# Patient Record
Sex: MALE | Race: WHITE | NOT HISPANIC OR LATINO | URBAN - METROPOLITAN AREA
[De-identification: names, ages, dates, MRNs, and addresses within clinical notes are randomized per-mention and may not be internally consistent; named-entity substitution may affect disease eponyms.]

---

## 2017-04-12 NOTE — PATIENT DISCUSSION
*POAG, OU: INTRAOCULAR PRESSURE IS WITHIN ACCEPTABLE LIMITS. STRESSED COMPLIANCE WITH CONSISTENT DROP USE. PT INSTRUCTED TO CONTINUE WITH DROPS AS PRESCRIBED AND RETURN FOR FOLLOW-UP AS SCHEDULED.

## 2018-01-09 NOTE — PATIENT DISCUSSION
POAG, OU Counseling: I have reviewed and have stressed the importance of compliance and regular follow up care as scheduled.

## 2018-01-09 NOTE — PATIENT DISCUSSION
POAG, OU: INTRAOCULAR PRESSURE IS WITHIN ACCEPTABLE LIMITS WITHOUT DROPS. PT INSTRUCTED TO RETURN FOR FOLLOW-UP AS SCHEDULED.

## 2018-04-19 NOTE — PATIENT DISCUSSION
Epiretinal Membrane Counseling: The diagnosis and natural history of epiretinal membrane was discussed with the patient including the risk of progression with retinal traction resulting in visual distortion. Patient instructed on how to do [address] to check for distortion in vision, The patient is instructed to call back immediately if any vision changes, and keep follow up as scheduled.

## 2018-04-19 NOTE — PATIENT DISCUSSION
*POAG, OU: INTRAOCULAR PRESSURE IS WITHIN ACCEPTABLE LIMITS S/P SLT OU. NO NEED FOR DROPS AT THIS TIME. RETURN FOR FOLLOW-UP AS SCHEDULED.

## 2018-08-30 ENCOUNTER — IMPORTED ENCOUNTER (OUTPATIENT)
Dept: URBAN - METROPOLITAN AREA CLINIC 50 | Facility: CLINIC | Age: 68
End: 2018-08-30

## 2019-04-18 NOTE — PATIENT DISCUSSION
Epiretinal Membrane Counseling: The diagnosis and natural history of epiretinal membrane was discussed with the patient including the risk of progression with retinal traction resulting in visual distortion. Patient instructed on how to do [address] to check for distortion in vision, The patient is instructed to call back immediately if any vision changes, and keep follow up as scheduled.

## 2019-04-18 NOTE — PATIENT DISCUSSION
MILD DRY EYE, OU: PRESCRIBED REFRESH REPAIR ARTIFICIAL TEARS PRN OU. RECOMMENDS OMEGA-3 FISH OIL WITH [facility]IMARY [facility] PHYSICIANS APPROVAL. RETURN FOR FOLLOW-UP AS SCHEDULED OR SOONER IF SYMPTOMS WORSEN.

## 2019-09-19 ENCOUNTER — IMPORTED ENCOUNTER (OUTPATIENT)
Dept: URBAN - METROPOLITAN AREA CLINIC 50 | Facility: CLINIC | Age: 69
End: 2019-09-19

## 2020-02-07 NOTE — PATIENT DISCUSSION
POAG, OU: PROGRESSION NOTED ON HVF, OU. PRESCRIBED LATANOPROST QHS OU. RETURN FOR FOLLOW AS SCHEDULED- IOP CHECK 4-6 weeks.

## 2020-02-07 NOTE — PATIENT DISCUSSION
New Prescription: latanoprost (latanoprost): drops: 0.005% 1 drop at bedtime as directed into both eyes 02-

## 2020-08-13 NOTE — PATIENT DISCUSSION
Epiretinal Membrane Counseling: The diagnosis and natural history of epiretinal membrane was discussed with the patient including the risk of progression with retinal traction resulting in visual distortion. Patient instructed on how to do [address] to check for distortion in vision, The patient is instructed to call back immediately if any vision changes, and keep follow up as scheduled.

## 2020-08-13 NOTE — PATIENT DISCUSSION
*POAG, OU: INTRAOCULAR PRESSURE IS SLIGHTLY HIGHER THAN THEY HAVE BEEN BUT STILL WITHIN ACCEPTABLE LIMITS. STRESSED COMPLIANCE WITH CONSISTENT DROP USE. PT INSTRUCTED TO CONTINUE WITH DROPS AS PRESCRIBED AND RETURN FOR FOLLOW-UP AS SCHEDULED.

## 2020-09-24 ENCOUNTER — IMPORTED ENCOUNTER (OUTPATIENT)
Dept: URBAN - METROPOLITAN AREA CLINIC 50 | Facility: CLINIC | Age: 70
End: 2020-09-24

## 2021-02-02 NOTE — PATIENT DISCUSSION
POAG OU: HVF TODAY STABLE OU. CONTINUE LATANOPROST QHS OU. RETURN FOR FOLLOW-UP AS DIRECTED. MONITOR.

## 2021-04-17 ASSESSMENT — TONOMETRY
OS_IOP_MMHG: 16
OD_IOP_MMHG: 19
OD_IOP_MMHG: 16
OD_IOP_MMHG: 20
OS_IOP_MMHG: 18
OS_IOP_MMHG: 18

## 2021-04-17 ASSESSMENT — VISUAL ACUITY
OD_SC: 20/25+2
OD_OTHER: 20/30. 20/400.
OS_PH: 20/40+1
OS_PH: 20/25-2
OD_PH: 20/25
OD_CC: J1+
OD_SC: 20/20
OD_BAT: 20/50
OD_CC: J1+
OS_OTHER: >20/400.
OS_SC: 20/70
OS_SC: 20/200
OS_BAT: 20/400
OD_SC: 20/30-1
OS_CC: J1+
OS_BAT: 20/40
OS_PH: 20/40
OS_OTHER: 20/40. <20/400.
OD_BAT: 20/30
OS_SC: 20/60+1
OS_CC: J1+
OD_BAT: 20/30
OD_OTHER: 20/30. 20/40.
OS_OTHER: 20/400. >20/400.
OS_BAT: >20/400
OD_OTHER: 20/50. 20/60.
OS_CC: J1+
OD_CC: J1+

## 2021-08-12 NOTE — PATIENT DISCUSSION
Epiretinal Membrane Counseling: The diagnosis and natural history of epiretinal membrane was discussed with the patient including the risk of progression with retinal traction resulting in visual distortion. Patient instructed on how to do [address] to check for distortion in vision, The patient is instructed to call back immediately if any vision changes, and keep follow up as scheduled.

## 2021-09-27 ENCOUNTER — PREPPED CHART (OUTPATIENT)
Dept: URBAN - METROPOLITAN AREA CLINIC 52 | Facility: CLINIC | Age: 71
End: 2021-09-27

## 2021-09-30 ENCOUNTER — ANNUAL COMPREHENSIVE EXAM (OUTPATIENT)
Dept: URBAN - METROPOLITAN AREA CLINIC 52 | Facility: CLINIC | Age: 71
End: 2021-09-30

## 2021-09-30 DIAGNOSIS — H25.13: ICD-10-CM

## 2021-09-30 PROCEDURE — 92015 DETERMINE REFRACTIVE STATE: CPT

## 2021-09-30 PROCEDURE — 92014 COMPRE OPH EXAM EST PT 1/>: CPT

## 2021-09-30 ASSESSMENT — VISUAL ACUITY
OS_CC: 20/200+1
OD_PH: 20/20-2
OS_PH: 20/40
OU_SC: J1+(-2)
OD_GLARE: 20/40
OS_GLARE: 20/400
OD_GLARE: 20/70
OD_CC: 20/40 SLOW

## 2021-09-30 ASSESSMENT — TONOMETRY
OS_IOP_MMHG: 14
OD_IOP_MMHG: 16

## 2021-10-13 ENCOUNTER — BIOMETRY (OUTPATIENT)
Dept: URBAN - METROPOLITAN AREA CLINIC 52 | Facility: CLINIC | Age: 71
End: 2021-10-13

## 2021-10-13 DIAGNOSIS — H25.13: ICD-10-CM

## 2021-10-13 DIAGNOSIS — H35.3131: ICD-10-CM

## 2021-10-13 PROCEDURE — 92136 OPHTHALMIC BIOMETRY: CPT

## 2021-10-13 PROCEDURE — 92134 CPTRZ OPH DX IMG PST SGM RTA: CPT

## 2021-10-13 PROCEDURE — TOPOIOL CORNEAL TOPOGRAPHY-PREMIUM IOL

## 2021-10-13 ASSESSMENT — KERATOMETRY
OD_AXISANGLE_DEGREES: 86
OD_K2POWER_DIOPTERS: 43.75
OD_K1POWER_DIOPTERS: 44.12
OD_AXISANGLE2_DEGREES: 176
OS_AXISANGLE2_DEGREES: 3
OS_AXISANGLE_DEGREES: 093
OS_K1POWER_DIOPTERS: 43.87
OS_K2POWER_DIOPTERS: 43.37

## 2021-10-28 ENCOUNTER — PRE OP - CE/IOL OS (OUTPATIENT)
Dept: URBAN - METROPOLITAN AREA CLINIC 52 | Facility: CLINIC | Age: 71
End: 2021-10-28

## 2021-10-28 DIAGNOSIS — H25.12: ICD-10-CM

## 2021-10-28 PROCEDURE — PREOP PRE OP VISIT

## 2021-10-28 ASSESSMENT — TONOMETRY
OD_IOP_MMHG: 14
OS_IOP_MMHG: 15

## 2021-10-28 ASSESSMENT — KERATOMETRY
OS_AXISANGLE_DEGREES: 093
OD_AXISANGLE2_DEGREES: 176
OS_K1POWER_DIOPTERS: 43.87
OS_AXISANGLE2_DEGREES: 3
OD_K2POWER_DIOPTERS: 43.75
OD_K1POWER_DIOPTERS: 44.12
OS_K2POWER_DIOPTERS: 43.37
OD_AXISANGLE_DEGREES: 86

## 2021-10-28 ASSESSMENT — VISUAL ACUITY
OS_SC: 20/200
OD_SC: 20/40-1

## 2021-10-28 NOTE — PATIENT DISCUSSION
CATARACT SURGERY PLANNER - STANDARD IOL/NO FEMTO: Phacoemulsification with IOL: Eye: LEFT|DOS: 11/3/21|Model: AABB0|Power: 20. 5|Target: NEAR|Visc: DUET|Omidria: YES|10% Phenylephrine: NO|Epi-shugarcaine: NO|Phaco Setting: DENSE|BSS+: NO|Trypan Blue: NO|CTR: NO|Olive Tip: NO|Atropine: NO|Pupilloplasty: NO|Notes: *.

## 2021-11-03 ENCOUNTER — SAME DAY PO (OUTPATIENT)
Dept: URBAN - METROPOLITAN AREA CLINIC 48 | Facility: CLINIC | Age: 71
End: 2021-11-03

## 2021-11-03 ENCOUNTER — SURGERY/PROCEDURE (OUTPATIENT)
Dept: URBAN - METROPOLITAN AREA SURGERY 16 | Facility: SURGERY | Age: 71
End: 2021-11-03

## 2021-11-03 DIAGNOSIS — Z98.42: ICD-10-CM

## 2021-11-03 DIAGNOSIS — H25.12: ICD-10-CM

## 2021-11-03 PROCEDURE — 66984 XCAPSL CTRC RMVL W/O ECP: CPT

## 2021-11-03 PROCEDURE — 99024 POSTOP FOLLOW-UP VISIT: CPT

## 2021-11-03 PROCEDURE — AAB00 SENSAR MONOFOCAL IOL – NO FEMTO

## 2021-11-03 ASSESSMENT — KERATOMETRY
OS_K1POWER_DIOPTERS: 43.87
OD_K1POWER_DIOPTERS: 44.12
OD_AXISANGLE2_DEGREES: 176
OD_K2POWER_DIOPTERS: 43.75
OS_AXISANGLE2_DEGREES: 3
OS_K1POWER_DIOPTERS: 43.87
OS_K2POWER_DIOPTERS: 43.37
OD_AXISANGLE_DEGREES: 86
OS_AXISANGLE2_DEGREES: 3
OD_K1POWER_DIOPTERS: 44.12
OD_AXISANGLE2_DEGREES: 176
OS_K2POWER_DIOPTERS: 43.37
OS_AXISANGLE_DEGREES: 093
OD_AXISANGLE_DEGREES: 86
OS_AXISANGLE_DEGREES: 093
OD_K2POWER_DIOPTERS: 43.75

## 2021-11-03 ASSESSMENT — VISUAL ACUITY: OS_CC: 20/150

## 2021-11-03 ASSESSMENT — TONOMETRY: OS_IOP_MMHG: 16

## 2021-11-03 NOTE — PROCEDURE NOTE: SURGICAL
"<span style=""color: [id number]; font-family: tammy [facility] negative - no change in level of consciousness

## 2021-11-11 ENCOUNTER — PRE OP - CE/IOL OD / 1 WEEK PO OS (OUTPATIENT)
Dept: URBAN - METROPOLITAN AREA CLINIC 52 | Facility: CLINIC | Age: 71
End: 2021-11-11

## 2021-11-11 DIAGNOSIS — H25.11: ICD-10-CM

## 2021-11-11 PROCEDURE — 92136 OPHTHALMIC BIOMETRY: CPT

## 2021-11-11 PROCEDURE — PREOP PRE OP VISIT

## 2021-11-11 ASSESSMENT — KERATOMETRY
OS_K2POWER_DIOPTERS: 43.37
OD_AXISANGLE_DEGREES: 86
OS_AXISANGLE2_DEGREES: 3
OD_K1POWER_DIOPTERS: 44.12
OS_AXISANGLE_DEGREES: 093
OS_K1POWER_DIOPTERS: 43.87
OD_AXISANGLE2_DEGREES: 176
OD_K2POWER_DIOPTERS: 43.75

## 2021-11-11 ASSESSMENT — VISUAL ACUITY
OS_PH: 20/30
OS_SC: J1
OD_SC: 20/30
OS_SC: 20/200
OD_PH: 20/20

## 2021-11-11 ASSESSMENT — TONOMETRY
OD_IOP_MMHG: 13
OS_IOP_MMHG: 14

## 2021-11-11 NOTE — PATIENT DISCUSSION
CATARACT SURGERY PLANNER - STANDARD IOL/NO FEMTO: Phacoemulsification with IOL: Eye: right|DOS: 12/8/21|Model: [id number]|Power: 16. 5|Target: dist|Visc: duet|Omidria: yes|10% Phenylephrine: no|Epi-shugarcaine: yes|Phaco Setting: dense|BSS+: no|Trypan Blue: no|CTR: no|Olive Tip: no|Atropine: no|Pupilloplasty: no|Notes: ARMD, MonoVA.

## 2021-12-07 ASSESSMENT — KERATOMETRY
OD_AXISANGLE_DEGREES: 86
OD_K1POWER_DIOPTERS: 44.12
OS_K1POWER_DIOPTERS: 43.87
OS_AXISANGLE_DEGREES: 093
OD_AXISANGLE2_DEGREES: 176
OS_AXISANGLE2_DEGREES: 3
OS_K2POWER_DIOPTERS: 43.37
OD_K2POWER_DIOPTERS: 43.75

## 2021-12-08 ENCOUNTER — SURGERY/PROCEDURE (OUTPATIENT)
Dept: URBAN - METROPOLITAN AREA SURGERY 16 | Facility: SURGERY | Age: 71
End: 2021-12-08

## 2021-12-08 ENCOUNTER — POST-OP (OUTPATIENT)
Dept: URBAN - METROPOLITAN AREA CLINIC 52 | Facility: CLINIC | Age: 71
End: 2021-12-08

## 2021-12-08 DIAGNOSIS — H25.11: ICD-10-CM

## 2021-12-08 DIAGNOSIS — Z98.41: ICD-10-CM

## 2021-12-08 DIAGNOSIS — Z96.1: ICD-10-CM

## 2021-12-08 PROCEDURE — 99024 POSTOP FOLLOW-UP VISIT: CPT

## 2021-12-08 PROCEDURE — 66984 XCAPSL CTRC RMVL W/O ECP: CPT

## 2021-12-08 ASSESSMENT — VISUAL ACUITY: OD_SC: 20/60-1

## 2021-12-08 ASSESSMENT — KERATOMETRY
OS_AXISANGLE_DEGREES: 093
OD_K1POWER_DIOPTERS: 44.12
OD_K2POWER_DIOPTERS: 43.75
OS_AXISANGLE2_DEGREES: 3
OS_K2POWER_DIOPTERS: 43.37
OD_AXISANGLE_DEGREES: 86
OD_AXISANGLE2_DEGREES: 176
OS_K1POWER_DIOPTERS: 43.87

## 2021-12-08 ASSESSMENT — TONOMETRY: OD_IOP_MMHG: 24

## 2021-12-08 NOTE — PATIENT DISCUSSION
The patient was found to be doing well postoperatively and was advised on drop instruction for steroid/nsaid/abx and home care. PO instructions reviewed with patient and family.

## 2021-12-08 NOTE — PATIENT DISCUSSION
Advised to call immediately if any worsening distortion or blurring is noted. FUV: 1.28.20    Last seen: 10.29.19    Per PDMP, last dispensed on 12.20.19 for both.    Is the patient due for refill of this medication(s): Yes    PDMP review: Criteria met. Forwarded to Physician/LETICIA for signature.

## 2021-12-16 ENCOUNTER — POST-OP (OUTPATIENT)
Dept: URBAN - METROPOLITAN AREA CLINIC 52 | Facility: CLINIC | Age: 71
End: 2021-12-16

## 2021-12-16 DIAGNOSIS — Z96.1: ICD-10-CM

## 2021-12-16 DIAGNOSIS — Z98.41: ICD-10-CM

## 2021-12-16 PROCEDURE — 99024 POSTOP FOLLOW-UP VISIT: CPT

## 2021-12-16 ASSESSMENT — TONOMETRY
OD_IOP_MMHG: 14
OS_IOP_MMHG: 14

## 2021-12-16 ASSESSMENT — KERATOMETRY
OS_AXISANGLE2_DEGREES: 3
OD_AXISANGLE2_DEGREES: 176
OD_K2POWER_DIOPTERS: 43.75
OD_AXISANGLE_DEGREES: 86
OD_K1POWER_DIOPTERS: 44.12
OS_K2POWER_DIOPTERS: 43.37
OS_AXISANGLE_DEGREES: 093
OS_K1POWER_DIOPTERS: 43.87

## 2021-12-16 ASSESSMENT — VISUAL ACUITY
OS_SC: 20/100
OS_PH: 20/25
OS_SC: J1+
OD_SC: 20/20

## 2022-01-13 ENCOUNTER — POST-OP (OUTPATIENT)
Dept: URBAN - METROPOLITAN AREA CLINIC 52 | Facility: CLINIC | Age: 72
End: 2022-01-13

## 2022-01-13 DIAGNOSIS — Z98.42: ICD-10-CM

## 2022-01-13 DIAGNOSIS — Z96.1: ICD-10-CM

## 2022-01-13 DIAGNOSIS — Z98.41: ICD-10-CM

## 2022-01-13 PROCEDURE — 99024 POSTOP FOLLOW-UP VISIT: CPT

## 2022-01-13 ASSESSMENT — VISUAL ACUITY
OS_SC: J1+
OU_SC: J1+@ 14 IN
OD_SC: 20/50
OS_SC: 20/25
OU_SC: 20/25-
OD_SC: 20/25-
OD_SC: J10
OS_SC: 20/200

## 2022-01-13 ASSESSMENT — KERATOMETRY
OD_AXISANGLE_DEGREES: 86
OD_K1POWER_DIOPTERS: 44.12
OS_AXISANGLE2_DEGREES: 3
OS_K1POWER_DIOPTERS: 43.87
OS_AXISANGLE_DEGREES: 093
OD_AXISANGLE2_DEGREES: 176
OS_K2POWER_DIOPTERS: 43.37
OD_K2POWER_DIOPTERS: 43.75

## 2022-01-13 ASSESSMENT — TONOMETRY
OS_IOP_MMHG: 12
OD_IOP_MMHG: 12

## 2022-03-31 ENCOUNTER — ESTABLISHED PATIENT (OUTPATIENT)
Dept: URBAN - METROPOLITAN AREA CLINIC 52 | Facility: CLINIC | Age: 72
End: 2022-03-31

## 2022-03-31 DIAGNOSIS — H35.3131: ICD-10-CM

## 2022-03-31 PROCEDURE — 92014 COMPRE OPH EXAM EST PT 1/>: CPT

## 2022-03-31 PROCEDURE — 92134 CPTRZ OPH DX IMG PST SGM RTA: CPT

## 2022-03-31 ASSESSMENT — KERATOMETRY
OD_K1POWER_DIOPTERS: 44.12
OD_K2POWER_DIOPTERS: 43.75
OS_K1POWER_DIOPTERS: 43.87
OS_AXISANGLE2_DEGREES: 3
OS_K2POWER_DIOPTERS: 43.37
OS_AXISANGLE_DEGREES: 093
OD_AXISANGLE_DEGREES: 86
OD_AXISANGLE2_DEGREES: 176

## 2022-03-31 ASSESSMENT — VISUAL ACUITY
OD_SC: 20/25-1
OS_SC: 20/100-1
OD_GLARE: 20/20
OU_SC: J1+
OS_GLARE: 20/20
OS_PH: 20/30

## 2022-03-31 ASSESSMENT — TONOMETRY
OD_IOP_MMHG: 12
OS_IOP_MMHG: 14

## 2023-04-17 ENCOUNTER — COMPREHENSIVE EXAM (OUTPATIENT)
Dept: URBAN - METROPOLITAN AREA CLINIC 52 | Facility: CLINIC | Age: 73
End: 2023-04-17

## 2023-04-17 DIAGNOSIS — H43.813: ICD-10-CM

## 2023-04-17 DIAGNOSIS — H26.493: ICD-10-CM

## 2023-04-17 DIAGNOSIS — H35.3131: ICD-10-CM

## 2023-04-17 PROCEDURE — 92134 CPTRZ OPH DX IMG PST SGM RTA: CPT

## 2023-04-17 PROCEDURE — 92014 COMPRE OPH EXAM EST PT 1/>: CPT

## 2023-04-17 ASSESSMENT — TONOMETRY
OD_IOP_MMHG: 13
OS_IOP_MMHG: 12

## 2023-04-17 ASSESSMENT — KERATOMETRY
OS_K1POWER_DIOPTERS: 43.87
OD_K1POWER_DIOPTERS: 44.12
OD_AXISANGLE_DEGREES: 86
OS_AXISANGLE2_DEGREES: 3
OS_K2POWER_DIOPTERS: 43.37
OS_AXISANGLE_DEGREES: 093
OD_K2POWER_DIOPTERS: 43.75
OD_AXISANGLE2_DEGREES: 176

## 2023-04-17 ASSESSMENT — VISUAL ACUITY
OD_SC: 20/20
OS_SC: 20/50
OU_SC: J1+

## 2024-04-22 ENCOUNTER — COMPREHENSIVE EXAM (OUTPATIENT)
Dept: URBAN - METROPOLITAN AREA CLINIC 52 | Facility: CLINIC | Age: 74
End: 2024-04-22

## 2024-04-22 DIAGNOSIS — H35.3131: ICD-10-CM

## 2024-04-22 DIAGNOSIS — H04.123: ICD-10-CM

## 2024-04-22 DIAGNOSIS — H26.493: ICD-10-CM

## 2024-04-22 DIAGNOSIS — H35.371: ICD-10-CM

## 2024-04-22 DIAGNOSIS — H43.813: ICD-10-CM

## 2024-04-22 PROCEDURE — 92134 CPTRZ OPH DX IMG PST SGM RTA: CPT

## 2024-04-22 PROCEDURE — 99214 OFFICE O/P EST MOD 30 MIN: CPT

## 2024-04-22 ASSESSMENT — VISUAL ACUITY
OS_GLARE: 20/20-1
OS_PH: 20/30-1
OD_SC: 20/20
OD_GLARE: 20/20-1
OS_SC: 20/100-1
OD_GLARE: 20/20-1
OU_SC: 20/20
OU_SC: 20/20"16IN

## 2024-04-22 ASSESSMENT — KERATOMETRY
OD_K1POWER_DIOPTERS: 44.00
OS_K1POWER_DIOPTERS: 43.75
OS_K2POWER_DIOPTERS: 44.25
OD_AXISANGLE_DEGREES: 165
OS_AXISANGLE_DEGREES: 037
OD_K2POWER_DIOPTERS: 44.25
OS_AXISANGLE2_DEGREES: 127
OD_AXISANGLE2_DEGREES: 75

## 2024-04-22 ASSESSMENT — TONOMETRY
OS_IOP_MMHG: 15
OD_IOP_MMHG: 14

## 2025-05-05 ENCOUNTER — COMPREHENSIVE EXAM (OUTPATIENT)
Age: 75
End: 2025-05-05

## 2025-05-05 DIAGNOSIS — H04.123: ICD-10-CM

## 2025-05-05 DIAGNOSIS — H35.3121: ICD-10-CM

## 2025-05-05 DIAGNOSIS — H26.493: ICD-10-CM

## 2025-05-05 DIAGNOSIS — H43.813: ICD-10-CM

## 2025-05-05 PROCEDURE — 99214 OFFICE O/P EST MOD 30 MIN: CPT

## 2025-05-05 PROCEDURE — 92134 CPTRZ OPH DX IMG PST SGM RTA: CPT
